# Patient Record
Sex: MALE | Race: WHITE | NOT HISPANIC OR LATINO | URBAN - METROPOLITAN AREA
[De-identification: names, ages, dates, MRNs, and addresses within clinical notes are randomized per-mention and may not be internally consistent; named-entity substitution may affect disease eponyms.]

---

## 2020-10-30 ENCOUNTER — LAB OUTSIDE AN ENCOUNTER (OUTPATIENT)
Dept: URBAN - METROPOLITAN AREA CLINIC 82 | Facility: CLINIC | Age: 80
End: 2020-10-30

## 2020-10-30 ENCOUNTER — WEB ENCOUNTER (OUTPATIENT)
Dept: URBAN - METROPOLITAN AREA CLINIC 82 | Facility: CLINIC | Age: 80
End: 2020-10-30

## 2020-10-30 ENCOUNTER — OFFICE VISIT (OUTPATIENT)
Dept: URBAN - METROPOLITAN AREA CLINIC 82 | Facility: CLINIC | Age: 80
End: 2020-10-30
Payer: MEDICARE

## 2020-10-30 DIAGNOSIS — R10.32 LLQ ABDOMINAL PAIN: ICD-10-CM

## 2020-10-30 DIAGNOSIS — K62.5 RECTAL BLEEDING: ICD-10-CM

## 2020-10-30 DIAGNOSIS — K59.01 CONSTIPATION BY DELAYED COLONIC TRANSIT: ICD-10-CM

## 2020-10-30 DIAGNOSIS — K64.1 GRADE II HEMORRHOIDS: ICD-10-CM

## 2020-10-30 PROBLEM — 301716002: Status: ACTIVE | Noted: 2020-10-30

## 2020-10-30 PROCEDURE — G8938 BMI DOC ONL FUP NT DOC: HCPCS | Performed by: INTERNAL MEDICINE

## 2020-10-30 PROCEDURE — G8427 DOCREV CUR MEDS BY ELIG CLIN: HCPCS | Performed by: INTERNAL MEDICINE

## 2020-10-30 PROCEDURE — 99204 OFFICE O/P NEW MOD 45 MIN: CPT | Performed by: INTERNAL MEDICINE

## 2020-10-30 PROCEDURE — G9903 PT SCRN TBCO ID AS NON USER: HCPCS | Performed by: INTERNAL MEDICINE

## 2020-10-30 PROCEDURE — 4004F PT TOBACCO SCREEN RCVD TLK: CPT | Performed by: INTERNAL MEDICINE

## 2020-10-30 PROCEDURE — G8482 FLU IMMUNIZE ORDER/ADMIN: HCPCS | Performed by: INTERNAL MEDICINE

## 2020-10-30 RX ORDER — ASPIRIN 325 MG/1
1 TABLET TABLET, COATED ORAL ONCE A DAY
Status: ACTIVE | COMMUNITY

## 2020-10-30 RX ORDER — HUMAN INSULIN 100 [IU]/ML
AS DIRECTED INJECTION, SUSPENSION SUBCUTANEOUS
Status: ACTIVE | COMMUNITY

## 2020-10-30 RX ORDER — VERAPAMIL HYDROCHLORIDE 120 MG/1
1 TABLET TABLET ORAL THREE TIMES A DAY
Status: ACTIVE | COMMUNITY

## 2020-10-30 RX ORDER — DOCUSATE SODIUM 100 MG/1
1 CAPSULE AS NEEDED CAPSULE, LIQUID FILLED ORAL ONCE A DAY
Status: ACTIVE | COMMUNITY

## 2020-10-30 RX ORDER — ALLOPURINOL 300 MG/1
1 TABLET TABLET ORAL ONCE A DAY
Status: ACTIVE | COMMUNITY

## 2020-10-30 RX ORDER — FUROSEMIDE 80 MG/1
1 TABLET TABLET ORAL ONCE A DAY
Status: ACTIVE | COMMUNITY

## 2020-10-30 RX ORDER — LINACLOTIDE 145 UG/1
1 CAPSULE AT LEAST 30 MINUTES BEFORE THE FIRST MEAL OF THE DAY ON AN EMPTY STOMACH CAPSULE, GELATIN COATED ORAL ONCE A DAY
Qty: 90 CAPSULE | Refills: 1 | OUTPATIENT
Start: 2020-10-30 | End: 2021-04-28

## 2020-10-30 RX ORDER — HYDROCORTISONE 10 MG/G
1 APPLICATION CREAM TOPICAL TWICE A DAY
Qty: 30 GRAM | Refills: 1 | OUTPATIENT

## 2020-10-30 RX ORDER — TERAZOSIN HYDROCHLORIDE 10 MG/1
1 CAPSULE AT BEDTIME CAPSULE ORAL ONCE A DAY
Status: ACTIVE | COMMUNITY

## 2020-10-30 RX ORDER — CARVEDILOL 25 MG/1
1 TABLET WITH FOOD TABLET, FILM COATED ORAL TWICE A DAY
Status: ACTIVE | COMMUNITY

## 2020-10-30 RX ORDER — COLCHICINE 0.6 MG/1
1 TABLET TABLET, FILM COATED ORAL ONCE A DAY
Status: ACTIVE | COMMUNITY

## 2020-10-30 RX ORDER — GABAPENTIN 100 MG/1
1 CAPSULE CAPSULE ORAL ONCE A DAY
Status: ACTIVE | COMMUNITY

## 2020-10-30 RX ORDER — DIGOXIN 125 UG/1
AS DIRECTED TABLET ORAL
Status: ACTIVE | COMMUNITY

## 2020-10-30 RX ORDER — METOLAZONE 5 MG/1
1 TABLET TABLET ORAL ONCE A DAY
Status: ACTIVE | COMMUNITY

## 2020-10-30 RX ORDER — POTASSIUM CHLORIDE 14.9 G/1000ML
AS DIRECTED INJECTION, SOLUTION INTRAVENOUS
Status: ACTIVE | COMMUNITY

## 2020-10-30 RX ORDER — FINASTERIDE 5 MG/1
1 TABLET TABLET, FILM COATED ORAL ONCE A DAY
Status: ACTIVE | COMMUNITY

## 2020-10-30 NOTE — HPI-TODAY'S VISIT:
81 yo WM here evaluation of rectal bleeding.  He was last seen in 2010 for GERD . He had a EGD and colonoscopy in 2010 and it showed reflux esophagitis, gastrits and several polyps, He was recommended to come back in 2013 but has not followed up.  He is c/o constipation and it is getting worse and takes laxatives.  He tried to takes stool softners with some relief. He drinks prune juice. He is c/o rectal bleeding when he is constipated. He has CAD , CVA and has arrythmia and hence he has a pacemaker and watchman procedures. He reports some SOB. C/o LLQ pain and discomfort and it improves with defecation.

## 2020-10-30 NOTE — PHYSICAL EXAM GASTROINTESTINAL
Abdomen , soft, nontender, nondistended , no guarding or rigidity , no masses palpable , normal bowel sounds , Liver and Spleen , no hepatomegaly present , no hepatosplenomegaly , liver nontender , spleen not palpable , Rectal , normal sphincter tone , both external andinternal hemorrhoids noted No rectal masses or bleeding present

## 2020-11-02 ENCOUNTER — TELEPHONE ENCOUNTER (OUTPATIENT)
Dept: URBAN - METROPOLITAN AREA CLINIC 82 | Facility: CLINIC | Age: 80
End: 2020-11-02

## 2020-11-02 RX ORDER — LACTULOSE 10 G/15ML
15 ML SOLUTION ORAL
Qty: 450 MILLILITER | Refills: 3 | OUTPATIENT
Start: 2020-11-04 | End: 2021-03-04

## 2020-12-01 ENCOUNTER — OFFICE VISIT (OUTPATIENT)
Dept: URBAN - METROPOLITAN AREA SURGERY CENTER 13 | Facility: SURGERY CENTER | Age: 80
End: 2020-12-01

## 2020-12-01 ENCOUNTER — OFFICE VISIT (OUTPATIENT)
Dept: URBAN - METROPOLITAN AREA SURGERY CENTER 13 | Facility: SURGERY CENTER | Age: 80
End: 2020-12-01
Payer: MEDICARE

## 2020-12-01 ENCOUNTER — CLAIMS CREATED FROM THE CLAIM WINDOW (OUTPATIENT)
Dept: URBAN - METROPOLITAN AREA CLINIC 4 | Facility: CLINIC | Age: 80
End: 2020-12-01
Payer: MEDICARE

## 2020-12-01 DIAGNOSIS — K92.1 BLACK STOOL: ICD-10-CM

## 2020-12-01 DIAGNOSIS — K63.89 OTHER SPECIFIED DISEASES OF INTESTINE: ICD-10-CM

## 2020-12-01 DIAGNOSIS — D12.2 ADENOMA OF ASCENDING COLON: ICD-10-CM

## 2020-12-01 DIAGNOSIS — D12.2 BENIGN NEOPLASM OF ASCENDING COLON: ICD-10-CM

## 2020-12-01 DIAGNOSIS — D12.3 BENIGN NEOPLASM OF TRANSVERSE COLON: ICD-10-CM

## 2020-12-01 DIAGNOSIS — D12.3 ADENOMA OF TRANSVERSE COLON: ICD-10-CM

## 2020-12-01 PROCEDURE — 45380 COLONOSCOPY AND BIOPSY: CPT | Performed by: INTERNAL MEDICINE

## 2020-12-01 PROCEDURE — 88305 TISSUE EXAM BY PATHOLOGIST: CPT | Performed by: PATHOLOGY

## 2020-12-01 PROCEDURE — G8907 PT DOC NO EVENTS ON DISCHARG: HCPCS | Performed by: INTERNAL MEDICINE

## 2020-12-01 PROCEDURE — G9937 DIG OR SURV COLSCO: HCPCS | Performed by: INTERNAL MEDICINE

## 2020-12-01 PROCEDURE — 45385 COLONOSCOPY W/LESION REMOVAL: CPT | Performed by: INTERNAL MEDICINE

## 2021-08-16 ENCOUNTER — DASHBOARD ENCOUNTERS (OUTPATIENT)
Age: 81
End: 2021-08-16

## 2021-08-16 ENCOUNTER — OFFICE VISIT (OUTPATIENT)
Dept: URBAN - METROPOLITAN AREA CLINIC 82 | Facility: CLINIC | Age: 81
End: 2021-08-16
Payer: MEDICARE

## 2021-08-16 ENCOUNTER — WEB ENCOUNTER (OUTPATIENT)
Dept: URBAN - METROPOLITAN AREA CLINIC 82 | Facility: CLINIC | Age: 81
End: 2021-08-16

## 2021-08-16 DIAGNOSIS — K21.9 GASTROESOPHAGEAL REFLUX DISEASE, UNSPECIFIED WHETHER ESOPHAGITIS PRESENT: ICD-10-CM

## 2021-08-16 DIAGNOSIS — K64.9 BLEEDING HEMORRHOID: ICD-10-CM

## 2021-08-16 DIAGNOSIS — K57.90 DIVERTICULOSIS: ICD-10-CM

## 2021-08-16 DIAGNOSIS — K59.01 CONSTIPATION BY DELAYED COLONIC TRANSIT: ICD-10-CM

## 2021-08-16 PROBLEM — 35298007: Status: ACTIVE | Noted: 2020-10-30

## 2021-08-16 PROBLEM — 235595009: Status: ACTIVE | Noted: 2021-08-16

## 2021-08-16 PROBLEM — 51551000: Status: ACTIVE | Noted: 2021-08-16

## 2021-08-16 PROBLEM — 397881000: Status: ACTIVE | Noted: 2021-08-16

## 2021-08-16 PROCEDURE — 99214 OFFICE O/P EST MOD 30 MIN: CPT | Performed by: INTERNAL MEDICINE

## 2021-08-16 RX ORDER — PRAVASTATIN SODIUM 40 MG/1
1 TABLET TABLET ORAL ONCE A DAY
Status: ACTIVE | COMMUNITY

## 2021-08-16 RX ORDER — ASPIRIN 325 MG/1
1 TABLET TABLET, COATED ORAL ONCE A DAY
Status: ACTIVE | COMMUNITY

## 2021-08-16 RX ORDER — HYDROCORTISONE 10 MG/G
1 APPLICATION CREAM TOPICAL TWICE A DAY
Qty: 30 GRAM | Refills: 1
End: 2021-10-15

## 2021-08-16 RX ORDER — FINASTERIDE 5 MG/1
1 TABLET TABLET, FILM COATED ORAL ONCE A DAY
Status: ACTIVE | COMMUNITY

## 2021-08-16 RX ORDER — ALLOPURINOL 300 MG/1
1 TABLET TABLET ORAL ONCE A DAY
Status: ACTIVE | COMMUNITY

## 2021-08-16 RX ORDER — TERAZOSIN HYDROCHLORIDE 10 MG/1
1 CAPSULE AT BEDTIME CAPSULE ORAL ONCE A DAY
Status: ACTIVE | COMMUNITY

## 2021-08-16 RX ORDER — POTASSIUM CHLORIDE 14.9 G/1000ML
AS DIRECTED INJECTION, SOLUTION INTRAVENOUS
Status: ACTIVE | COMMUNITY

## 2021-08-16 RX ORDER — GABAPENTIN 100 MG/1
1 CAPSULE CAPSULE ORAL ONCE A DAY
Status: ACTIVE | COMMUNITY

## 2021-08-16 RX ORDER — HUMAN INSULIN 100 [IU]/ML
AS DIRECTED INJECTION, SUSPENSION SUBCUTANEOUS
Status: ACTIVE | COMMUNITY

## 2021-08-16 RX ORDER — VERAPAMIL HYDROCHLORIDE 120 MG/1
1 TABLET TABLET ORAL THREE TIMES A DAY
Status: ACTIVE | COMMUNITY

## 2021-08-16 RX ORDER — CARVEDILOL 25 MG/1
1 TABLET WITH FOOD TABLET, FILM COATED ORAL TWICE A DAY
Status: ACTIVE | COMMUNITY

## 2021-08-16 RX ORDER — HYDROCORTISONE 10 MG/G
1 APPLICATION CREAM TOPICAL TWICE A DAY
Qty: 30 GRAM | Refills: 1 | COMMUNITY

## 2021-08-16 RX ORDER — DIGOXIN 125 UG/1
AS DIRECTED TABLET ORAL
Status: ACTIVE | COMMUNITY

## 2021-08-16 RX ORDER — FUROSEMIDE 80 MG/1
1 TABLET TABLET ORAL ONCE A DAY
Status: ACTIVE | COMMUNITY

## 2021-08-16 RX ORDER — METOLAZONE 5 MG/1
1 TABLET TABLET ORAL ONCE A DAY
Status: ACTIVE | COMMUNITY

## 2021-08-16 RX ORDER — COLCHICINE 0.6 MG/1
1 TABLET TABLET, FILM COATED ORAL ONCE A DAY
Status: ACTIVE | COMMUNITY

## 2021-08-16 RX ORDER — DOCUSATE SODIUM 100 MG/1
1 CAPSULE AS NEEDED CAPSULE, LIQUID FILLED ORAL ONCE A DAY
Status: ACTIVE | COMMUNITY

## 2021-08-16 RX ORDER — LORATADINE 10 MG
1 PACKET MIXED WITH 8 OUNCES OF FLUID TABLET,DISINTEGRATING ORAL ONCE A DAY
Qty: 30 PACK | Refills: 3 | OUTPATIENT
Start: 2021-08-16 | End: 2021-12-13

## 2021-08-16 NOTE — HPI-TODAY'S VISIT:
81 yo WM here evaluation of rectal bleeding.  He was last seen in 2010 for GERD . He had a EGD and colonoscopy in 2010 and it showed reflux esophagitis, gastrits and several polyps, He was recommended to come back in 2013 but has not followed up.  He is c/o constipation and it is getting worse and takes laxatives.  He tried to takes stool softners with some relief. He drinks prune juice. He is c/o rectal bleeding when he is constipated. He has CAD , CVA and has arrythmia and hence he has a pacemaker and watchman procedures. He reports some SOB. C/o LLQ pain and discomfort and it improves with defecation. Patient reports having left lower quadrant abdominal pain as well as suprapubic abdominal pain recently was treated with antibiotics for cystitis.  He is being followed with a urologist closely and had a CT scan of the abdomen pelvis was told of diverticulosis and possibly diverticulitis however there is no evidence of diverticulitis on the noncontrast CT.  Constipation is better when he takes laxatives however Linzess is very expensive.  He is currently taking over-the-counter stool softeners and prunes which is not effective.  Denies any unintentional weight loss.  He has had a colonoscopy in December 2020 showed multiple colon polyps too numerous to count.  However repeat surveillance colonoscopy was not planned given his age.

## 2021-08-16 NOTE — PHYSICAL EXAM GASTROINTESTINAL
Abdomen , soft, nontender, nondistended , no guarding or rigidity , no masses palpable , normal bowel sounds , Liver and Spleen , no hepatomegaly present , no hepatosplenomegaly , liver nontender , spleen not palpable , Rectal , normal sphincter tone , both external and  internal hemorrhoids noted No  rectal masses or bleeding present enlarged prostrate noted

## 2022-04-12 ENCOUNTER — APPOINTMENT (RX ONLY)
Dept: URBAN - METROPOLITAN AREA CLINIC 45 | Facility: CLINIC | Age: 82
Setting detail: DERMATOLOGY
End: 2022-04-12

## 2022-04-12 DIAGNOSIS — L57.0 ACTINIC KERATOSIS: ICD-10-CM

## 2022-04-12 DIAGNOSIS — L82.1 OTHER SEBORRHEIC KERATOSIS: ICD-10-CM

## 2022-04-12 DIAGNOSIS — L81.4 OTHER MELANIN HYPERPIGMENTATION: ICD-10-CM

## 2022-04-12 DIAGNOSIS — D22 MELANOCYTIC NEVI: ICD-10-CM

## 2022-04-12 DIAGNOSIS — D18.0 HEMANGIOMA: ICD-10-CM

## 2022-04-12 DIAGNOSIS — Z85.828 PERSONAL HISTORY OF OTHER MALIGNANT NEOPLASM OF SKIN: ICD-10-CM

## 2022-04-12 PROBLEM — D48.5 NEOPLASM OF UNCERTAIN BEHAVIOR OF SKIN: Status: ACTIVE | Noted: 2022-04-12

## 2022-04-12 PROBLEM — D18.01 HEMANGIOMA OF SKIN AND SUBCUTANEOUS TISSUE: Status: ACTIVE | Noted: 2022-04-12

## 2022-04-12 PROBLEM — D22.61 MELANOCYTIC NEVI OF RIGHT UPPER LIMB, INCLUDING SHOULDER: Status: ACTIVE | Noted: 2022-04-12

## 2022-04-12 PROCEDURE — ? BIOPSY BY SHAVE METHOD

## 2022-04-12 PROCEDURE — ? LIQUID NITROGEN

## 2022-04-12 PROCEDURE — 69100 BIOPSY OF EXTERNAL EAR: CPT | Mod: 59

## 2022-04-12 PROCEDURE — 17003 DESTRUCT PREMALG LES 2-14: CPT

## 2022-04-12 PROCEDURE — ? FULL BODY SKIN EXAM - DECLINED

## 2022-04-12 PROCEDURE — ? SUNSCREEN RECOMMENDATIONS

## 2022-04-12 PROCEDURE — 99203 OFFICE O/P NEW LOW 30 MIN: CPT | Mod: 25

## 2022-04-12 PROCEDURE — ? ADDITIONAL NOTES

## 2022-04-12 PROCEDURE — ? COUNSELING

## 2022-04-12 PROCEDURE — ? ELECTRODESICCATION (COSMETIC)

## 2022-04-12 PROCEDURE — 17000 DESTRUCT PREMALG LESION: CPT

## 2022-04-12 ASSESSMENT — LOCATION SIMPLE DESCRIPTION DERM
LOCATION SIMPLE: RIGHT CHEEK
LOCATION SIMPLE: RIGHT SHOULDER
LOCATION SIMPLE: RIGHT NOSE
LOCATION SIMPLE: UPPER BACK
LOCATION SIMPLE: RIGHT SHOULDER
LOCATION SIMPLE: LEFT SHOULDER
LOCATION SIMPLE: RIGHT INFERIOR EYELID
LOCATION SIMPLE: LEFT EAR

## 2022-04-12 ASSESSMENT — LOCATION DETAILED DESCRIPTION DERM
LOCATION DETAILED: LEFT SCAPHA
LOCATION DETAILED: INFERIOR THORACIC SPINE
LOCATION DETAILED: RIGHT SUPERIOR MEDIAL MALAR CHEEK
LOCATION DETAILED: LEFT SUPERIOR HELIX
LOCATION DETAILED: RIGHT ANTERIOR SHOULDER
LOCATION DETAILED: RIGHT NASAL SIDEWALL
LOCATION DETAILED: RIGHT LATERAL INFERIOR EYELID
LOCATION DETAILED: RIGHT ANTERIOR SHOULDER
LOCATION DETAILED: LEFT ANTERIOR SHOULDER

## 2022-04-12 ASSESSMENT — LOCATION ZONE DERM
LOCATION ZONE: TRUNK
LOCATION ZONE: FACE
LOCATION ZONE: ARM
LOCATION ZONE: EAR
LOCATION ZONE: EYELID
LOCATION ZONE: NOSE
LOCATION ZONE: ARM

## 2022-04-12 NOTE — PROCEDURE: ELECTRODESICCATION (COSMETIC)
Anesthesia Type: 1% lidocaine with epinephrine
Price (Use Numbers Only, No Special Characters Or $): 100
Post-Care Instructions: I reviewed with the patient in detail post-care instructions. Patient is to wear sunprotection, and avoid picking at any of the treated lesions. Pt may apply Vaseline to crusted or scabbing areas
Detail Level: Detailed
Consent: The patient's consent was obtained including but not limited to risks of crusting, scabbing, blistering, scarring, darker or lighter pigmentary change, recurrence, incomplete removal and infection.
Anesthesia Volume In Cc: 0.2

## 2022-04-12 NOTE — HPI: EVALUATION OF SKIN LESION(S)
What Type Of Note Output Would You Prefer (Optional)?: Bullet Format
Hpi Title: Evaluation of Skin Lesions
How Severe Are Your Spot(S)?: mild
Location: Nose